# Patient Record
Sex: FEMALE | Race: WHITE | Employment: FULL TIME | URBAN - METROPOLITAN AREA
[De-identification: names, ages, dates, MRNs, and addresses within clinical notes are randomized per-mention and may not be internally consistent; named-entity substitution may affect disease eponyms.]

---

## 2020-10-07 PROBLEM — N60.09 CYST OF BREAST: Status: ACTIVE | Noted: 2020-10-07

## 2020-10-07 PROBLEM — R51.9 HEADACHE DISORDER: Status: ACTIVE | Noted: 2020-10-07

## 2020-10-07 PROBLEM — Z79.899 OTHER LONG TERM (CURRENT) DRUG THERAPY: Status: ACTIVE | Noted: 2020-10-07

## 2020-10-07 PROBLEM — M53.9: Status: ACTIVE | Noted: 2020-10-07

## 2020-10-07 PROBLEM — E78.5 HLD (HYPERLIPIDEMIA): Status: ACTIVE | Noted: 2020-10-07

## 2020-10-07 PROBLEM — N83.209 CYST OF OVARY: Status: ACTIVE | Noted: 2020-10-07

## 2020-10-07 PROBLEM — F17.200 TOBACCO DEPENDENCE: Status: ACTIVE | Noted: 2020-10-07

## 2020-10-07 PROBLEM — F41.9 ANXIETY: Status: ACTIVE | Noted: 2020-10-07

## 2020-10-07 PROBLEM — J45.909 ASTHMA: Status: ACTIVE | Noted: 2020-10-07

## 2021-05-11 PROBLEM — R07.9 CHEST PAIN: Status: ACTIVE | Noted: 2021-05-08

## 2021-09-02 PROBLEM — E78.5 HYPERLIPIDEMIA, UNSPECIFIED: Status: ACTIVE | Noted: 2020-09-04

## 2021-09-02 PROBLEM — Z79.84 LONG TERM (CURRENT) USE OF ORAL HYPOGLYCEMIC DRUGS: Status: ACTIVE | Noted: 2020-09-04

## 2022-09-07 PROBLEM — E78.2 MIXED HYPERLIPIDEMIA: Status: ACTIVE | Noted: 2020-09-04

## 2022-10-25 PROBLEM — E66.01 MORBID (SEVERE) OBESITY DUE TO EXCESS CALORIES (HCC): Status: ACTIVE | Noted: 2022-10-25

## 2022-10-25 PROBLEM — R73.9 HYPERGLYCEMIA: Status: ACTIVE | Noted: 2022-10-25

## 2023-04-11 PROBLEM — R20.2 PARESTHESIA OF SKIN: Status: ACTIVE | Noted: 2022-07-22

## 2023-04-11 PROBLEM — J06.9 ACUTE UPPER RESPIRATORY INFECTION, UNSPECIFIED: Status: ACTIVE | Noted: 2022-01-13

## 2023-04-11 PROBLEM — R22.31 LOCALIZED SWELLING, MASS AND LUMP, RIGHT UPPER LIMB: Status: ACTIVE | Noted: 2022-07-25

## 2023-04-11 PROBLEM — S93.691A OTHER SPRAIN OF RIGHT FOOT, INITIAL ENCOUNTER: Status: ACTIVE | Noted: 2023-02-08

## 2023-04-11 PROBLEM — S60.211A CONTUSION OF MULTIPLE SITES OF HAND AND WRIST, RIGHT, INITIAL ENCOUNTER: Status: ACTIVE | Noted: 2022-07-09

## 2023-04-11 PROBLEM — R10.31 RIGHT LOWER QUADRANT PAIN: Status: ACTIVE | Noted: 2022-04-19

## 2023-04-11 PROBLEM — M25.531 PAIN IN RIGHT WRIST: Status: ACTIVE | Noted: 2022-07-25

## 2023-04-11 PROBLEM — S90.32XA: Status: ACTIVE | Noted: 2022-07-09

## 2023-04-11 PROBLEM — E78.1 PURE HYPERGLYCERIDEMIA: Status: ACTIVE | Noted: 2022-12-05

## 2023-04-11 PROBLEM — G43.909 MIGRAINE WITHOUT STATUS MIGRAINOSUS, NOT INTRACTABLE: Status: ACTIVE | Noted: 2022-10-09

## 2023-04-11 PROBLEM — S67.197A CRUSHING INJURY OF LEFT LITTLE FINGER: Status: ACTIVE | Noted: 2022-11-11

## 2023-04-11 PROBLEM — S60.221A CONTUSION OF MULTIPLE SITES OF HAND AND WRIST, RIGHT, INITIAL ENCOUNTER: Status: ACTIVE | Noted: 2022-07-09

## 2023-12-01 PROBLEM — S43.401A SPRAIN OF SHOULDER, RIGHT: Status: ACTIVE | Noted: 2023-12-01

## 2024-03-18 PROBLEM — G89.29 OTHER CHRONIC PAIN: Status: ACTIVE | Noted: 2024-02-02

## 2024-03-18 PROBLEM — R55 SYNCOPE AND COLLAPSE: Status: ACTIVE | Noted: 2023-06-07

## 2024-03-18 PROBLEM — S90.122A CONTUSION OF TOE OF LEFT FOOT, INITIAL ENCOUNTER: Status: ACTIVE | Noted: 2023-03-21

## 2024-03-18 PROBLEM — U07.1 COVID-19: Status: ACTIVE | Noted: 2023-12-12

## 2024-03-18 PROBLEM — G47.30 SLEEP APNEA: Status: ACTIVE | Noted: 2023-07-25

## 2024-03-18 PROBLEM — G43.909 MIGRAINE HEADACHE: Status: ACTIVE | Noted: 2024-02-02

## 2024-03-18 PROBLEM — J45.909 UNSPECIFIED ASTHMA, UNCOMPLICATED: Status: ACTIVE | Noted: 2020-10-07

## 2024-03-18 PROBLEM — J32.8 OTHER CHRONIC SINUSITIS: Status: ACTIVE | Noted: 2023-10-30

## 2024-04-23 PROBLEM — J35.9 CHRONIC DISEASE OF TONSILS OR ADENOIDS: Status: ACTIVE | Noted: 2024-04-23

## 2024-05-22 PROBLEM — J35.01 CHRONIC TONSILLITIS: Status: ACTIVE | Noted: 2024-05-22

## 2024-05-22 NOTE — H&P
BACK PAIN. 5/2/24   Lloyd Barclay APRN - CNP   butalbital-acetaminophen-caffeine (FIORICET, ESGIC) -40 MG per tablet Take 1 tablet by mouth every 6 hours as needed for Headaches 5/2/24   Lloyd Barclay APRN - CNP   bisoprolol (ZEBETA) 5 MG tablet Take 1 tablet by mouth daily 5/2/24   Lloyd Barclay APRN - CNP   montelukast (SINGULAIR) 10 MG tablet Take 1 tablet by mouth nightly 5/2/24   Lloyd Barclay APRN - CNP   rosuvastatin (CRESTOR) 20 MG tablet Take 1 tablet by mouth daily 5/2/24   Lloyd Barclay APRN - CNP   omega-3 acid ethyl esters (LOVAZA) 1 g capsule Take 2 capsules by mouth 2 times daily 5/2/24   Lloyd Barclay APRN - CNP   Sodium Chloride-Sodium Bicarb 1.57 g PACK 1 Package by Nasal route in the morning and at bedtime Dissolve packet in distilled water and irrigate both nostrils twice daily 3/5/24   Santana Rosales MD   budesonide-formoterol (SYMBICORT) 160-4.5 MCG/ACT AERO Inhale 2 puffs into the lungs 2 times daily 2/8/24   Lloyd Barclay APRN - CNP   albuterol sulfate HFA (VENTOLIN HFA) 108 (90 Base) MCG/ACT inhaler Inhale 2 puffs into the lungs every 6 hours as needed for Wheezing for wheezing 2/8/24   Lloyd Barclay APRN - CNP   DULoxetine (CYMBALTA) 60 MG extended release capsule Take 1 capsule by mouth daily 2/8/24   Lloyd Barclay APRN - CNP   ranolazine (RANEXA) 500 MG extended release tablet Take 1 tablet by mouth 2 times daily 11/13/23 11/7/24  Lang Remy MD   pramoxine-hydrocortisone (PRAMOSONE) 1-2.5 % cream Apply topically 3 times daily. 8/29/23   Lloyd Barclay APRN - CNP   fluticasone (FLONASE) 50 MCG/ACT nasal spray PLACE 1 SPRAY IN EACH NOSTRIL ONCE DAILY. 6/29/23   Lloyd Barclay APRN - CNP   TALTZ 80 MG/ML SOAJ prefilled syringe Inject 1 mL into the skin every 30 days 4/25/23   Provider, MD Trae   folic acid (FOLVITE) 1 MG tablet  7/11/22   Provider, MD Trae   clobetasol (TEMOVATE) 0.05 % cream  11/30/21   Provider,  MD Trae      Current Facility-Administered Medications: sodium chloride flush 0.9 % injection 5-40 mL, 5-40 mL, IntraVENous, 2 times per day  sodium chloride flush 0.9 % injection 5-40 mL, 5-40 mL, IntraVENous, PRN  0.9 % sodium chloride infusion, , IntraVENous, PRN  nitroGLYCERIN (NITROSTAT) SL tablet 0.4 mg, 0.4 mg, SubLINGual, Q5 Min PRN    Allergies:  Bactrim [sulfamethoxazole-trimethoprim]    Social History:   reports that she has been smoking cigarettes. She has a 25.0 pack-year smoking history. She has been exposed to tobacco smoke. She has never used smokeless tobacco. She reports that she does not drink alcohol and does not use drugs.     Family History: family history is not on file. No h/o sudden cardiac death. No for premature CAD.    REVIEW OF SYSTEMS:    Constitutional: No unintentional weight loss.  No change in energy or activity level.     Eyes: No visual changes or diplopia.   ENT: No URI symptoms.   Cardiovascular: As above in HPI.  Respiratory: As above in HPI.  Gastrointestinal: No abdominal pain or change in bowel habits.  Genitourinary: No frequency, dysuria or hematuria.  Musculoskeletal:  No gait disturbance.  Reports diffuse nonspecific myalgias/arthralgias.  Integumentary: No rash or pruritis.  Neurological: No headache.  No history of seizures.  Psychiatric: Reports anxiety/stress.  No reported suicidal ideation.  Hematologic/Lymphatic: No abnormal bruising or bleeding or swollen lymph nodes.    PHYSICAL EXAM:      BP (!) 140/72   Pulse 72   Temp 97.9 °F (36.6 °C) (Temporal)   Resp 19   Ht 1.575 m (5' 2\")   Wt 105.7 kg (233 lb)   SpO2 100%   BMI 42.62 kg/m²      No intake or output data in the 24 hours ending 05/24/24 1030    General:  alert, cooperative, and appears bit uncomfortable with significant URI symptoms  HEENT: atraumatic and normocephalic, extra occular movements normal, ear/nose symmetrical and non tender  Neck: no gross lymphadenopathy, no masses

## 2024-05-24 ENCOUNTER — HOSPITAL ENCOUNTER (OUTPATIENT)
Age: 44
Setting detail: OUTPATIENT SURGERY
Discharge: HOME | End: 2024-05-24
Attending: INTERNAL MEDICINE | Admitting: INTERNAL MEDICINE
Payer: COMMERCIAL

## 2024-05-24 VITALS
WEIGHT: 233 LBS | OXYGEN SATURATION: 97 % | BODY MASS INDEX: 42.88 KG/M2 | SYSTOLIC BLOOD PRESSURE: 127 MMHG | DIASTOLIC BLOOD PRESSURE: 68 MMHG | HEIGHT: 62 IN | HEART RATE: 69 BPM | RESPIRATION RATE: 12 BRPM | TEMPERATURE: 97.9 F

## 2024-05-24 DIAGNOSIS — I20.89 ANGINA AT REST (HCC): ICD-10-CM

## 2024-05-24 LAB — ECHO BSA: 2.15 M2

## 2024-05-24 PROCEDURE — 6360000002 HC RX W HCPCS: Performed by: INTERNAL MEDICINE

## 2024-05-24 PROCEDURE — 99152 MOD SED SAME PHYS/QHP 5/>YRS: CPT | Performed by: INTERNAL MEDICINE

## 2024-05-24 PROCEDURE — 2580000003 HC RX 258: Performed by: INTERNAL MEDICINE

## 2024-05-24 PROCEDURE — C1769 GUIDE WIRE: HCPCS | Performed by: INTERNAL MEDICINE

## 2024-05-24 PROCEDURE — 2709999900 HC NON-CHARGEABLE SUPPLY: Performed by: INTERNAL MEDICINE

## 2024-05-24 PROCEDURE — 6360000004 HC RX CONTRAST MEDICATION: Performed by: INTERNAL MEDICINE

## 2024-05-24 PROCEDURE — C1894 INTRO/SHEATH, NON-LASER: HCPCS | Performed by: INTERNAL MEDICINE

## 2024-05-24 PROCEDURE — 93458 L HRT ARTERY/VENTRICLE ANGIO: CPT | Performed by: INTERNAL MEDICINE

## 2024-05-24 PROCEDURE — 2500000003 HC RX 250 WO HCPCS: Performed by: INTERNAL MEDICINE

## 2024-05-24 RX ORDER — SODIUM CHLORIDE 9 MG/ML
INJECTION, SOLUTION INTRAVENOUS PRN
Status: DISCONTINUED | OUTPATIENT
Start: 2024-05-24 | End: 2024-05-24 | Stop reason: HOSPADM

## 2024-05-24 RX ORDER — MIDAZOLAM HYDROCHLORIDE 1 MG/ML
INJECTION INTRAMUSCULAR; INTRAVENOUS PRN
Status: DISCONTINUED | OUTPATIENT
Start: 2024-05-24 | End: 2024-05-24 | Stop reason: HOSPADM

## 2024-05-24 RX ORDER — HEPARIN SODIUM 1000 [USP'U]/ML
INJECTION, SOLUTION INTRAVENOUS; SUBCUTANEOUS PRN
Status: DISCONTINUED | OUTPATIENT
Start: 2024-05-24 | End: 2024-05-24 | Stop reason: HOSPADM

## 2024-05-24 RX ORDER — NITROGLYCERIN 0.4 MG/1
0.4 TABLET SUBLINGUAL EVERY 5 MIN PRN
Status: DISCONTINUED | OUTPATIENT
Start: 2024-05-24 | End: 2024-05-24 | Stop reason: HOSPADM

## 2024-05-24 RX ORDER — SODIUM CHLORIDE 0.9 % (FLUSH) 0.9 %
5-40 SYRINGE (ML) INJECTION EVERY 12 HOURS SCHEDULED
Status: DISCONTINUED | OUTPATIENT
Start: 2024-05-24 | End: 2024-05-24 | Stop reason: HOSPADM

## 2024-05-24 RX ORDER — SODIUM CHLORIDE 0.9 % (FLUSH) 0.9 %
5-40 SYRINGE (ML) INJECTION PRN
Status: DISCONTINUED | OUTPATIENT
Start: 2024-05-24 | End: 2024-05-24 | Stop reason: HOSPADM

## 2024-05-24 RX ADMIN — SODIUM CHLORIDE 75 ML/HR: 9 INJECTION, SOLUTION INTRAVENOUS at 09:13

## 2024-05-24 NOTE — PROGRESS NOTES
Patient received post cath to [facility] room 4. Assessment obtained.  Post cath pathway initiated. Right radial site with TR band inflated with 12 mL of air intact. No hematoma noted. Patient without complaints.

## 2024-05-24 NOTE — PROGRESS NOTES
Patient admitted, consent signed and questions answered. Patient ready for procedure. Call light to reach with side rails up 2 of 2. Bilateral groin areas clipped with writer and Naz present.  Boyfriend at bedside with patient.  History and physical needs updated.

## 2024-05-24 NOTE — PROGRESS NOTES
Air removed fromVasc band in  2 mL increments until all air removed. No bleeding or hematoma noted.  Pressure dressing  applied, radial pulse palpable.    Ambulated to bathroom and in halls.  Gait steady..     All discharge instructions reviewed, questions answered, paper signed and given copy. Patient discharged per ambulatory with boyfriend and belongings.

## (undated) DEVICE — ANGIOGRAPHIC CATHETER: Brand: EXPO™

## (undated) DEVICE — GLIDESHEATH SLENDER STAINLESS STEEL KIT: Brand: GLIDESHEATH SLENDER

## (undated) DEVICE — GUIDEWIRE 35/260/FC/PTFE/3J: Brand: GUIDEWIRE

## (undated) DEVICE — BAND COMPR L24CM REG CLR PLAS HEMSTAT EXT HK AND LOOP RETEN

## (undated) DEVICE — SURGICAL PROCEDURE TRAY CRD CATH SVMMC